# Patient Record
(demographics unavailable — no encounter records)

---

## 2025-05-01 NOTE — HISTORY OF PRESENT ILLNESS
[de-identified] : LEFT EAR DISCOMFORT FOR PAST FEW WEEKS ANTIBIOTIC HAD PARTIAL EFFECT HEAVY SMOKING MEDICAL HX REVIEWED BRAIN ANEURYSM

## 2025-05-01 NOTE — ASSESSMENT
[FreeTextEntry1] : SMOKING CESSATION DISCUSSED NEILMED NASAL SALINE TMJ INSTRUCTIONS NEUROSURGERY REFERRAL F/U AFTER ABOVE FLUOCINOLON DIET DISCUSSED

## 2025-05-01 NOTE — REVIEW OF SYSTEMS
[Sneezing] : sneezing [Seasonal Allergies] : seasonal allergies [Post Nasal Drip] : post nasal drip [Ear Pain] : ear pain [Ear Itch] : ear itch [Dizziness] : dizziness [Vertigo] : vertigo [Recurrent Ear Infections] : recurrent ear infections [Lightheadedness] : lightheadedness [Nasal Congestion] : nasal congestion [Recurrent Sinus Infections] : recurrent sinus infections [Sinus Pain] : sinus pain [Sinus Pressure] : sinus pressure [Throat Clearing] : throat clearing [Negative] : Heme/Lymph [Patient Intake Form Reviewed] : Patient intake form was reviewed

## 2025-05-01 NOTE — PHYSICAL EXAM
[de-identified] : LEFT DISCOMFORT [de-identified] : MILD CANAL IRRITATION [de-identified] : NASAL MUCOSAL IRRITATION/ CONGESTION [Normal] : mucosa is normal [Midline] : trachea located in midline position

## 2025-06-04 NOTE — HISTORY OF PRESENT ILLNESS
[FreeTextEntry1] : Ms. Sharlene Frausto is a R-hand dominant 62F with PMH current smoker 1/2 - 1 PPD who presents today for follow up of a cerebral aneurysm. She denies history of hypertension. She reports that she had an aunt and uncle who both  of "blood clots" in the brain. She originally presented in 2024 with L side neck/muscle pain along with burning sensation to L face and L ear. 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 She was evaluated by us and was scheduled for an angiogram, but she did not have the procedure. She presents today for follow up. She reports that she feels well, denies headache, weakness, numbness, tingling, difficulty walking, difficulty speaking, dizziness.   PCP: Shayna Gordon

## 2025-06-04 NOTE — ASSESSMENT
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 She presents for follow up.  Plan: - Cerebral angiogram to further assess aneurysm, tentatively scheduled for )__ - PST prior to procedure - Further plan pending angiogram - Repeat MRA head now to assess aneurysm  - Follow up after imaging performed  - Patient in agreement with plan.

## 2025-07-10 NOTE — ASSESSMENT
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   Plan: - Aneurysm embolization with flow-diverting stent, tentatively planned for Tuesday, August 26, 2025 - Increase aspirin to 325 mg daily and plavix 75 mg daily one week prior to procedure  - PST, medical clearance prior to procedure - Prednisone 50mg 13 hours prior, 7 hours prior, and hour prior to procedure along with benadryl 50 mg one hour prior to procedure  - Patient and family in agreement with plan.

## 2025-07-10 NOTE — END OF VISIT
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  She would prefer to undergo embolization with flow diversion.  Because of the small size of the parent vessel I mention to her that I would prefer to use silk Vista baby as compassionate use since it is not yet approved by the FDA.  She used agreeable and all questions were answered.  The risk, benefits alternatives of flow diversion were discussed with the patient and her family.  She is tentatively scheduled for August 26, 2025. [Time Spent: ___ minutes] : I have spent [unfilled] minutes of time on the encounter which excludes teaching and separately reported services.

## 2025-07-10 NOTE — PHYSICAL EXAM
[FreeTextEntry1] : Constitutional: NAD Neuro * Mental Status:  GCS 15: Awake, alert, oriented to conversation. No aphasia or difficulty speaking. No dysarthria. * Cranial Nerves: Cnii-Cnxii grossly intact. EOMI, tongue midline, no gaze deviation, no facial droop * Motor: b/l UE and LE intact * Sensory: Sensation intact to light touch * Reflexes: not assessed Cardiovascular: Regular rate and rhythm. Eyes: See neurologic examination with detailed examination of eyes. Respiratory: non labored breathing Musculoskeletal: No muscle wasting noted Skin: L wrist with mild ecchymoses

## 2025-07-10 NOTE — HISTORY OF PRESENT ILLNESS
[FreeTextEntry1] : Ms. Sharlene Frausto is a R-hand dominant 62F with PMH current smoker 1/2 - 1 PPD who presents today for follow up of a cerebral aneurysm. She denies history of hypertension. She reports that she had an aunt and uncle who both  of "blood clots" in the brain. She originally presented in 2024 with L side neck/muscle pain along with burning sensation to L face and L ear. 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 She was evaluated by us and was scheduled for an angiogram, but she did not have the procedure at that time. She then underwent cerebral angiogram on 25 which confirmed a 5mm L PICA aneurysm. She presents today for follow up. She reports that she feels well, admits to mild wrist pain and bruising from the procedure but otherwise denies headache, weakness, numbness, tingling, difficulty walking, difficulty speaking, dizziness.   PCP: Shayna Gordon